# Patient Record
Sex: FEMALE
[De-identification: names, ages, dates, MRNs, and addresses within clinical notes are randomized per-mention and may not be internally consistent; named-entity substitution may affect disease eponyms.]

---

## 2017-12-31 ENCOUNTER — CHARTING TRANS (OUTPATIENT)
Dept: OTHER | Age: 62
End: 2017-12-31

## 2017-12-31 ENCOUNTER — LAB SERVICES (OUTPATIENT)
Dept: OTHER | Age: 62
End: 2017-12-31

## 2017-12-31 LAB — RAPID STREP GROUP A: NORMAL

## 2018-11-02 VITALS
OXYGEN SATURATION: 97 % | HEART RATE: 64 BPM | RESPIRATION RATE: 18 BRPM | BODY MASS INDEX: 25.38 KG/M2 | HEIGHT: 70 IN | WEIGHT: 177.25 LBS | TEMPERATURE: 98.4 F

## 2019-03-27 PROCEDURE — 88175 CYTOPATH C/V AUTO FLUID REDO: CPT | Performed by: NURSE PRACTITIONER

## 2025-02-14 RX ORDER — CALCITONIN SALMON 200 [IU]/.09ML
1 SPRAY, METERED NASAL DAILY
COMMUNITY

## 2025-02-14 RX ORDER — METRONIDAZOLE 500 MG/100ML
500 INJECTION, SOLUTION INTRAVENOUS EVERY 12 HOURS
Status: CANCELLED | OUTPATIENT
Start: 2025-02-14

## 2025-02-14 RX ORDER — ASPIRIN 81 MG/1
81 TABLET ORAL DAILY
COMMUNITY

## 2025-02-14 RX ORDER — LEVOTHYROXINE SODIUM 100 UG/1
100 TABLET ORAL DAILY
COMMUNITY

## 2025-02-14 RX ORDER — MULTIVIT WITH MINERALS/LUTEIN
250 TABLET ORAL NIGHTLY
COMMUNITY

## 2025-02-14 RX ORDER — GINSENG 100 MG
1 CAPSULE ORAL DAILY
COMMUNITY

## 2025-02-14 RX ORDER — CHOLECALCIFEROL (VITAMIN D3) 25 MCG
1 TABLET ORAL 2 TIMES DAILY
COMMUNITY

## 2025-02-14 RX ORDER — ALLOPURINOL 100 MG/1
100 TABLET ORAL DAILY
COMMUNITY

## 2025-02-18 ENCOUNTER — LABORATORY ENCOUNTER (OUTPATIENT)
Dept: LAB | Age: 70
End: 2025-02-18
Attending: OBSTETRICS & GYNECOLOGY
Payer: MEDICARE

## 2025-02-18 DIAGNOSIS — Z01.818 ENCOUNTER FOR PREADMISSION TESTING: ICD-10-CM

## 2025-02-18 LAB
ANTIBODY SCREEN: NEGATIVE
ERYTHROCYTE [DISTWIDTH] IN BLOOD BY AUTOMATED COUNT: 16.9 %
HCT VFR BLD AUTO: 36.8 %
HGB BLD-MCNC: 11.5 G/DL
MCH RBC QN AUTO: 27.6 PG (ref 26–34)
MCHC RBC AUTO-ENTMCNC: 31.3 G/DL (ref 31–37)
MCV RBC AUTO: 88.5 FL
PLATELET # BLD AUTO: 259 10(3)UL (ref 150–450)
RBC # BLD AUTO: 4.16 X10(6)UL
RH BLOOD TYPE: NEGATIVE
WBC # BLD AUTO: 7.7 X10(3) UL (ref 4–11)

## 2025-02-18 PROCEDURE — 85027 COMPLETE CBC AUTOMATED: CPT

## 2025-02-18 PROCEDURE — 36415 COLL VENOUS BLD VENIPUNCTURE: CPT

## 2025-02-18 PROCEDURE — 86900 BLOOD TYPING SEROLOGIC ABO: CPT

## 2025-02-18 PROCEDURE — 86850 RBC ANTIBODY SCREEN: CPT

## 2025-02-18 PROCEDURE — 86901 BLOOD TYPING SEROLOGIC RH(D): CPT

## 2025-02-26 NOTE — H&P
GYNECOLOGIC ONCOLOGY PRE-OPERATIVE H&P     DATE: 2/26/2025             HISTORY OF PRESENT ILLNESS:   Quiana Nuñez is a 69 year old female who was seen in the [facility] emergency room October 2024 for diffuse severe abdominal pain, was admitted for pancreatitis.      CT that showed a 4.3 cm right adnexal lesion      Follow up pelvic US showed endometrial thickness of 1.1 cm, a 1.7 cm endometrial lesion, and a right adnexal lesions     Pt reports menopause mid/late 40s; reports consistently heavy and painful periods while having cycle, denies vaginal bleeding since; had been under regular care of OBGYN all of life, was considering D&C right before menopause due to heavy periods      Patient was referred by Dr Albright.     INTERIM HISTORY:  Underwent MRI at [facility] in [place] Epic media     Completed labs     PAST MEDICAL HISTORY:       Past Medical History:   Diagnosis Date    Astrocytoma brain tumor (HCC) 1992    Essential hypertension      Glaucoma      History of brain shunt       current since 1992    Hyperlipidemia      Melanoma (HCC) 1989    Trigeminal neuralgia           SURGICAL HISTORY:         Past Surgical History:   Procedure Laterality Date    CHOLECYSTECTOMY   10/30/2024    EYE SURGERY   2019     retinal surgery    OTHER SURGICAL HISTORY   1992     brain shunt    OTHER SURGICAL HISTORY   2018     brain shunt replacement    OTHER SURGICAL HISTORY   2018     brain shunt blockage, blood clot    OTHER SURGICAL HISTORY   2018     kidney tumor removed    OTHER SURGICAL HISTORY         retina sx    TUBAL LIGATION   1991         ALLERGIES:    Allergies      Acid Blockers Suppo*    OTHER (SEE COMMENTS)    Comment:Hypertension (oral steroids)  Prednisone              JITTERY        MEDICATIONS:    Encounter Medications   latanoprost 0.005 % Ophthalmic Solution, , Disp: , Rfl:   Levothyroxine Sodium 25 MCG Oral Tab, TK 1 T PO Q MORNING, Disp: , Rfl: 2  Lisinopril-hydroCHLOROthiazide 20-12.5 MG Oral Tab, ,  Disp: , Rfl: 3  Rosuvastatin Calcium 5 MG Oral Tab, TAKE 1 TABLET BY MOUTH ONE TIME A DAY, Disp: , Rfl: 3  atenolol 50 MG Oral Tab, TK 1 T PO QD, Disp: , Rfl: 1     No facility-administered encounter medications on file as of 2025.         GYNECOLOGIC HISTORY:  No LMP recorded. (Menstrual status: Other).  Patient has no history on file for sexual activity.     LAST PAP:   25 NIL      PATHOLOGY:         Lab Results   Component Value Date     FINALDX   2025       Endometrium; biopsy:  - Predominantly blood, with very rare strips of epithelium, not further diagnostic  - Please see comment                TUMOR MARKERS:           Lab Results   Component Value Date      46.5 (H) 2025            Lab Results   Component Value Date     CEA 0.8 2025      No results found for: \"\"        LAST SCREENING MAMMOGRAM:  2024: There are no suspicious masses, calcifications, or areas of architectural distortion.         OBSTETRIC HISTORY:   OB History     T0    L2    SAB0  IAB0  Ectopic0  Multiple0  Live Births0      SOCIAL HISTORY:  Patient lives at [place], with family.     Ambulatory Status:  independent  Performance Status:  0 - Fully active, able to carry on all pre-disease performance without restriction.       Social History       Socioeconomic History      Marital status:       Spouse name: Not on file      Number of children: Not on file      Years of education: Not on file      Highest education level: Not on file    Occupational History      Not on file    Tobacco Use      Smoking status: Never      Smokeless tobacco: Never    Vaping Use      Vaping status: Never Used    Substance and Sexual Activity      Alcohol use: No      Drug use: No      Sexual activity: Not on file    Other Topics      Concerns:        Not on file    Social History Narrative      Not on file     Social Determinants of Health  Financial Resource Strain: Not on file  Food Insecurity: Not on  file  Transportation Needs: Not on file  Physical Activity: Not on file  Stress: Not on file  Social Connections: Not on file  Intimate Partner Violence: Not on file  Housing Stability: Not on file        FAMILY HISTORY:        Family History   Problem Relation Age of Onset    Breast Cancer Mother 70         survived    Heart Disease Mother      Hypertension Mother      Heart Disease Father      Hypertension Father      Prostate Cancer Father      Heart Disease Maternal Grandmother      Hypertension Maternal Grandmother      Diabetes Maternal Grandmother      Stroke Maternal Grandmother      Heart Disease Maternal Grandfather      Hypertension Maternal Grandfather      Diabetes Maternal Grandfather      Hypertension Paternal Grandmother      Hypertension Paternal Grandfather      Stroke Paternal Grandfather      Breast Cancer Paternal Aunt           REVIEW OF SYSTEMS:  General ROS: negative  Ophthalmic ROS: positive for - under care for optometrist   ENT ROS: negative  Cardiovascular ROS: no chest pain or dyspnea on exertion, negative  Respiratory ROS: no cough, shortness of breath, or wheezing, negative  Gastrointestinal ROS: no abdominal pain, change in bowel habits, or black or bloody stools, negative  Genito-Urinary ROS: no dysuria, trouble voiding, or hematuria, negative  Musculoskeletal ROS: negative  Neurological ROS: no TIA or stroke symptoms, negative  Psychological ROS: negative  Endocrine ROS: negative  Hematological and Lymphatic ROS: negative     PHYSICAL EXAMINATION FROM IN OFFICE VISIT ON 1/22/25:  /74   Wt 171 lb (77.6 kg)   BMI 23.85 kg/m²      GENERAL: alert and oriented, cooperative, in no acute distress  HEENT: extra ocular movement intact  THYROID: normal to inspection and palpation  LYMPH NODES: Cervical, supraclavicular, and axillary nodes normal.  LUNGS: clear to auscultation bilaterally  HEART: regular rate and rhythm  ABDOMEN: soft, non-tender. Bowel sounds normal. No masses,  no  organomegaly  EXTREMITIES: extremities normal, atraumatic, no cyanosis or edema  NEUROLOGIC: motor grossly intact  VULVA: normal appearing vulva with no masses, tenderness or lesions   URETHRA: normal without discharge or scarring  VAGINA: normal mucosa without prolapse or lesions  CERVIX: multiparous appearance and nabothian cyst  UTERUS: normal single, nontender  LEFT ADNEXA: non palpable  RIGHT ADNEXA:   fullness, non-tender  RECTOVAGINAL: deferred  BACK: symmetric, no curvature. ROM normal. No CVA tenderness.        ASSESSMENT:  Endometrial thickening on ultrasound     Endometrial mass     Ovarian mass, right     Elevated CA-125     69 year old female with diffuse severe abdominal pain, was admitted for pancreatitis with an incidental finding of an ovarian mass and thickened endometrium.     Oct 2024 CT that showed a 4.3 cm right adnexal lesion      Follow up pelvic US showed endometrial thickness of 1.1 cm, a 1.7 cm endometrial lesion, and a right adnexal lesions  -Spoke to [facility] regarding discrepency between the size of the right adnexal lesion on CT which says 4.3 cm and US saying 19 cm. Radiologist confirmed lesion is 4.3 cm and solid appeaing and the US will be addended     1/5/25 MRI pelvic 6.2 cm fibroid uterus with 3 fibroids measuring up to 1.4 cm, 1.1 cm endometrium with a 1.6 cm endometrial mass, left ovarian mass measuring  2.3 cm, 4.2 solid right ovarian mass, small amount of ascites, no lymphadenopathy.      1/2/25 EMBx non-diagnostic     CEA 0.8   46.5     The index of suspicion for malignancy is moderate      Comorbidities: [place],  shunt, tubal, right kidney tumor removed     PLAN:    Patient given options of alternative treatment/surgical intervention including hyst/BSO given risk for malignancy. Discussed the possibility of robotic assisted surgery if cleared by neurosurgery for  shunt as pt will be in 29 degrees of trendelenburg and there is risk of embolus with  laparoscopy surgery in the setting of  shunt. Risks reviewed with pt and spouse. Discussed the possibility of xlap.     Surgical intervention planned: RA TLH/BSO, staging, possible xlap     Patient counseled on Risk/Benefits/Alternatives/Indications including but not limited to anesthesia, bleeding, death, infection, injury to adjacent organs (including GI, , Vascular and Neurologic Structures), need for further operation, need for further therapy, transfusion, Venous Thrombolytic Disease, wound breakdown dehiscence, lymphedema, possible inability to stage, possible dissemination of disease.     Medical Clearance obtained by Dr. Dickey on 2/3/25  Neurosurgery Clearance obtained by Dr. Jonathan Avendano at [facility] on 1/24/25     Yanci Domingo PA-C  02/26/2025

## 2025-02-27 ENCOUNTER — HOSPITAL ENCOUNTER (OUTPATIENT)
Facility: HOSPITAL | Age: 70
Discharge: HOME | End: 2025-02-28
Attending: OBSTETRICS & GYNECOLOGY | Admitting: OBSTETRICS & GYNECOLOGY
Payer: MEDICARE

## 2025-02-27 ENCOUNTER — ANESTHESIA (OUTPATIENT)
Dept: SURGERY | Facility: HOSPITAL | Age: 70
End: 2025-02-27
Payer: MEDICARE

## 2025-02-27 ENCOUNTER — ANESTHESIA EVENT (OUTPATIENT)
Dept: SURGERY | Facility: HOSPITAL | Age: 70
End: 2025-02-27
Payer: MEDICARE

## 2025-02-27 DIAGNOSIS — N94.89 ENDOMETRIAL MASS: ICD-10-CM

## 2025-02-27 DIAGNOSIS — Z01.818 ENCOUNTER FOR PREADMISSION TESTING: Primary | ICD-10-CM

## 2025-02-27 LAB — RH BLOOD TYPE: NEGATIVE

## 2025-02-27 PROCEDURE — 88108 CYTOPATH CONCENTRATE TECH: CPT | Performed by: OBSTETRICS & GYNECOLOGY

## 2025-02-27 PROCEDURE — 0UT24ZZ RESECTION OF BILATERAL OVARIES, PERCUTANEOUS ENDOSCOPIC APPROACH: ICD-10-PCS | Performed by: OBSTETRICS & GYNECOLOGY

## 2025-02-27 PROCEDURE — 88307 TISSUE EXAM BY PATHOLOGIST: CPT | Performed by: OBSTETRICS & GYNECOLOGY

## 2025-02-27 PROCEDURE — 8E0W4CZ ROBOTIC ASSISTED PROCEDURE OF TRUNK REGION, PERCUTANEOUS ENDOSCOPIC APPROACH: ICD-10-PCS | Performed by: OBSTETRICS & GYNECOLOGY

## 2025-02-27 PROCEDURE — 07BC4ZX EXCISION OF PELVIS LYMPHATIC, PERCUTANEOUS ENDOSCOPIC APPROACH, DIAGNOSTIC: ICD-10-PCS | Performed by: OBSTETRICS & GYNECOLOGY

## 2025-02-27 PROCEDURE — 88331 PATH CONSLTJ SURG 1 BLK 1SPC: CPT | Performed by: OBSTETRICS & GYNECOLOGY

## 2025-02-27 PROCEDURE — 07BD4ZX EXCISION OF AORTIC LYMPHATIC, PERCUTANEOUS ENDOSCOPIC APPROACH, DIAGNOSTIC: ICD-10-PCS | Performed by: OBSTETRICS & GYNECOLOGY

## 2025-02-27 PROCEDURE — 0UT74ZZ RESECTION OF BILATERAL FALLOPIAN TUBES, PERCUTANEOUS ENDOSCOPIC APPROACH: ICD-10-PCS | Performed by: OBSTETRICS & GYNECOLOGY

## 2025-02-27 PROCEDURE — 88305 TISSUE EXAM BY PATHOLOGIST: CPT | Performed by: OBSTETRICS & GYNECOLOGY

## 2025-02-27 PROCEDURE — 0UT94ZZ RESECTION OF UTERUS, PERCUTANEOUS ENDOSCOPIC APPROACH: ICD-10-PCS | Performed by: OBSTETRICS & GYNECOLOGY

## 2025-02-27 PROCEDURE — 88309 TISSUE EXAM BY PATHOLOGIST: CPT | Performed by: OBSTETRICS & GYNECOLOGY

## 2025-02-27 RX ORDER — ATENOLOL 50 MG/1
50 TABLET ORAL DAILY
Status: DISCONTINUED | OUTPATIENT
Start: 2025-02-27 | End: 2025-02-27

## 2025-02-27 RX ORDER — HYDROMORPHONE HYDROCHLORIDE 1 MG/ML
0.6 INJECTION, SOLUTION INTRAMUSCULAR; INTRAVENOUS; SUBCUTANEOUS EVERY 5 MIN PRN
Status: DISCONTINUED | OUTPATIENT
Start: 2025-02-27 | End: 2025-02-27 | Stop reason: HOSPADM

## 2025-02-27 RX ORDER — LISINOPRIL AND HYDROCHLOROTHIAZIDE 12.5; 2 MG/1; MG/1
1 TABLET ORAL DAILY
Status: DISCONTINUED | OUTPATIENT
Start: 2025-02-27 | End: 2025-02-27

## 2025-02-27 RX ORDER — HYDROMORPHONE HYDROCHLORIDE 1 MG/ML
INJECTION, SOLUTION INTRAMUSCULAR; INTRAVENOUS; SUBCUTANEOUS
Status: COMPLETED
Start: 2025-02-27 | End: 2025-02-27

## 2025-02-27 RX ORDER — SODIUM CHLORIDE, SODIUM LACTATE, POTASSIUM CHLORIDE, CALCIUM CHLORIDE 600; 310; 30; 20 MG/100ML; MG/100ML; MG/100ML; MG/100ML
INJECTION, SOLUTION INTRAVENOUS CONTINUOUS
Status: ACTIVE | OUTPATIENT
Start: 2025-02-27 | End: 2025-02-27

## 2025-02-27 RX ORDER — HYDROMORPHONE HYDROCHLORIDE 1 MG/ML
0.8 INJECTION, SOLUTION INTRAMUSCULAR; INTRAVENOUS; SUBCUTANEOUS EVERY 2 HOUR PRN
Status: DISCONTINUED | OUTPATIENT
Start: 2025-02-27 | End: 2025-02-28

## 2025-02-27 RX ORDER — ACETAMINOPHEN 500 MG
1000 TABLET ORAL ONCE AS NEEDED
Status: DISCONTINUED | OUTPATIENT
Start: 2025-02-27 | End: 2025-02-27 | Stop reason: HOSPADM

## 2025-02-27 RX ORDER — DEXAMETHASONE SODIUM PHOSPHATE 4 MG/ML
VIAL (ML) INJECTION AS NEEDED
Status: DISCONTINUED | OUTPATIENT
Start: 2025-02-27 | End: 2025-02-27 | Stop reason: SURG

## 2025-02-27 RX ORDER — IBUPROFEN 600 MG/1
600 TABLET, FILM COATED ORAL ONCE AS NEEDED
Status: DISCONTINUED | OUTPATIENT
Start: 2025-02-27 | End: 2025-02-27 | Stop reason: HOSPADM

## 2025-02-27 RX ORDER — ONDANSETRON 2 MG/ML
4 INJECTION INTRAMUSCULAR; INTRAVENOUS EVERY 8 HOURS PRN
Status: DISCONTINUED | OUTPATIENT
Start: 2025-02-27 | End: 2025-02-28

## 2025-02-27 RX ORDER — METRONIDAZOLE 500 MG/100ML
500 INJECTION, SOLUTION INTRAVENOUS ONCE
Status: COMPLETED | OUTPATIENT
Start: 2025-02-27 | End: 2025-02-27

## 2025-02-27 RX ORDER — MIDAZOLAM HYDROCHLORIDE 1 MG/ML
INJECTION INTRAMUSCULAR; INTRAVENOUS AS NEEDED
Status: DISCONTINUED | OUTPATIENT
Start: 2025-02-27 | End: 2025-02-27 | Stop reason: SURG

## 2025-02-27 RX ORDER — ACETAMINOPHEN 500 MG
1000 TABLET ORAL ONCE
Status: DISCONTINUED | OUTPATIENT
Start: 2025-02-27 | End: 2025-02-27 | Stop reason: HOSPADM

## 2025-02-27 RX ORDER — LATANOPROST 50 UG/ML
1 SOLUTION/ DROPS OPHTHALMIC NIGHTLY
Status: DISCONTINUED | OUTPATIENT
Start: 2025-02-27 | End: 2025-02-28

## 2025-02-27 RX ORDER — HYDROMORPHONE HYDROCHLORIDE 1 MG/ML
0.4 INJECTION, SOLUTION INTRAMUSCULAR; INTRAVENOUS; SUBCUTANEOUS EVERY 5 MIN PRN
Status: DISCONTINUED | OUTPATIENT
Start: 2025-02-27 | End: 2025-02-27 | Stop reason: HOSPADM

## 2025-02-27 RX ORDER — ROSUVASTATIN CALCIUM 10 MG/1
10 TABLET, COATED ORAL NIGHTLY
Status: DISCONTINUED | OUTPATIENT
Start: 2025-02-27 | End: 2025-02-28

## 2025-02-27 RX ORDER — HYDRALAZINE HYDROCHLORIDE 20 MG/ML
10 INJECTION INTRAMUSCULAR; INTRAVENOUS ONCE
Status: DISCONTINUED | OUTPATIENT
Start: 2025-02-27 | End: 2025-02-27 | Stop reason: HOSPADM

## 2025-02-27 RX ORDER — ENOXAPARIN SODIUM 100 MG/ML
40 INJECTION SUBCUTANEOUS DAILY
Status: DISCONTINUED | OUTPATIENT
Start: 2025-02-28 | End: 2025-02-28

## 2025-02-27 RX ORDER — HEPARIN SODIUM 5000 [USP'U]/ML
5000 INJECTION, SOLUTION INTRAVENOUS; SUBCUTANEOUS ONCE
Status: COMPLETED | OUTPATIENT
Start: 2025-02-27 | End: 2025-02-27

## 2025-02-27 RX ORDER — ONDANSETRON 2 MG/ML
4 INJECTION INTRAMUSCULAR; INTRAVENOUS EVERY 6 HOURS PRN
Status: DISCONTINUED | OUTPATIENT
Start: 2025-02-27 | End: 2025-02-27 | Stop reason: HOSPADM

## 2025-02-27 RX ORDER — NALOXONE HYDROCHLORIDE 0.4 MG/ML
0.08 INJECTION, SOLUTION INTRAMUSCULAR; INTRAVENOUS; SUBCUTANEOUS AS NEEDED
Status: DISCONTINUED | OUTPATIENT
Start: 2025-02-27 | End: 2025-02-27 | Stop reason: HOSPADM

## 2025-02-27 RX ORDER — HYDROCODONE BITARTRATE AND ACETAMINOPHEN 5; 325 MG/1; MG/1
1 TABLET ORAL EVERY 6 HOURS PRN
Status: DISCONTINUED | OUTPATIENT
Start: 2025-02-27 | End: 2025-02-28

## 2025-02-27 RX ORDER — BUPIVACAINE HYDROCHLORIDE 2.5 MG/ML
INJECTION, SOLUTION EPIDURAL; INFILTRATION; INTRACAUDAL AS NEEDED
Status: DISCONTINUED | OUTPATIENT
Start: 2025-02-27 | End: 2025-02-27 | Stop reason: HOSPADM

## 2025-02-27 RX ORDER — HYDROCODONE BITARTRATE AND ACETAMINOPHEN 5; 325 MG/1; MG/1
2 TABLET ORAL ONCE AS NEEDED
Status: DISCONTINUED | OUTPATIENT
Start: 2025-02-27 | End: 2025-02-27 | Stop reason: HOSPADM

## 2025-02-27 RX ORDER — HYDROMORPHONE HYDROCHLORIDE 1 MG/ML
0.2 INJECTION, SOLUTION INTRAMUSCULAR; INTRAVENOUS; SUBCUTANEOUS EVERY 2 HOUR PRN
Status: DISCONTINUED | OUTPATIENT
Start: 2025-02-27 | End: 2025-02-28

## 2025-02-27 RX ORDER — ACETAMINOPHEN 325 MG/1
650 TABLET ORAL EVERY 4 HOURS PRN
Status: DISCONTINUED | OUTPATIENT
Start: 2025-02-27 | End: 2025-02-28

## 2025-02-27 RX ORDER — INDOCYANINE GREEN AND WATER 25 MG
KIT INJECTION AS NEEDED
Status: DISCONTINUED | OUTPATIENT
Start: 2025-02-27 | End: 2025-02-27 | Stop reason: HOSPADM

## 2025-02-27 RX ORDER — ONDANSETRON 2 MG/ML
INJECTION INTRAMUSCULAR; INTRAVENOUS
Status: COMPLETED
Start: 2025-02-27 | End: 2025-02-27

## 2025-02-27 RX ORDER — HYDROCODONE BITARTRATE AND ACETAMINOPHEN 5; 325 MG/1; MG/1
1 TABLET ORAL ONCE AS NEEDED
Status: DISCONTINUED | OUTPATIENT
Start: 2025-02-27 | End: 2025-02-27 | Stop reason: HOSPADM

## 2025-02-27 RX ORDER — ATENOLOL 50 MG/1
50 TABLET ORAL DAILY
Status: DISCONTINUED | OUTPATIENT
Start: 2025-02-28 | End: 2025-02-28

## 2025-02-27 RX ORDER — HYDROMORPHONE HYDROCHLORIDE 1 MG/ML
0.2 INJECTION, SOLUTION INTRAMUSCULAR; INTRAVENOUS; SUBCUTANEOUS EVERY 5 MIN PRN
Status: DISCONTINUED | OUTPATIENT
Start: 2025-02-27 | End: 2025-02-27 | Stop reason: HOSPADM

## 2025-02-27 RX ORDER — SODIUM CHLORIDE, SODIUM LACTATE, POTASSIUM CHLORIDE, CALCIUM CHLORIDE 600; 310; 30; 20 MG/100ML; MG/100ML; MG/100ML; MG/100ML
INJECTION, SOLUTION INTRAVENOUS CONTINUOUS
Status: DISCONTINUED | OUTPATIENT
Start: 2025-02-27 | End: 2025-02-28

## 2025-02-27 RX ORDER — DOCUSATE SODIUM 100 MG/1
100 CAPSULE, LIQUID FILLED ORAL 2 TIMES DAILY
Status: DISCONTINUED | OUTPATIENT
Start: 2025-02-28 | End: 2025-02-28

## 2025-02-27 RX ORDER — SODIUM CHLORIDE, SODIUM LACTATE, POTASSIUM CHLORIDE, CALCIUM CHLORIDE 600; 310; 30; 20 MG/100ML; MG/100ML; MG/100ML; MG/100ML
INJECTION, SOLUTION INTRAVENOUS CONTINUOUS
Status: DISCONTINUED | OUTPATIENT
Start: 2025-02-27 | End: 2025-02-27 | Stop reason: HOSPADM

## 2025-02-27 RX ORDER — ONDANSETRON 2 MG/ML
INJECTION INTRAMUSCULAR; INTRAVENOUS AS NEEDED
Status: DISCONTINUED | OUTPATIENT
Start: 2025-02-27 | End: 2025-02-27 | Stop reason: SURG

## 2025-02-27 RX ORDER — METOCLOPRAMIDE HYDROCHLORIDE 5 MG/ML
10 INJECTION INTRAMUSCULAR; INTRAVENOUS EVERY 8 HOURS PRN
Status: DISCONTINUED | OUTPATIENT
Start: 2025-02-27 | End: 2025-02-27 | Stop reason: HOSPADM

## 2025-02-27 RX ORDER — HYDROMORPHONE HYDROCHLORIDE 1 MG/ML
0.4 INJECTION, SOLUTION INTRAMUSCULAR; INTRAVENOUS; SUBCUTANEOUS EVERY 2 HOUR PRN
Status: DISCONTINUED | OUTPATIENT
Start: 2025-02-27 | End: 2025-02-28

## 2025-02-27 RX ORDER — HEPARIN SODIUM 5000 [USP'U]/ML
INJECTION, SOLUTION INTRAVENOUS; SUBCUTANEOUS
Status: COMPLETED
Start: 2025-02-27 | End: 2025-02-27

## 2025-02-27 RX ORDER — ROCURONIUM BROMIDE 10 MG/ML
INJECTION, SOLUTION INTRAVENOUS AS NEEDED
Status: DISCONTINUED | OUTPATIENT
Start: 2025-02-27 | End: 2025-02-27 | Stop reason: SURG

## 2025-02-27 RX ORDER — LEVOTHYROXINE SODIUM 100 UG/1
100 TABLET ORAL DAILY
Status: DISCONTINUED | OUTPATIENT
Start: 2025-02-28 | End: 2025-02-28

## 2025-02-27 RX ORDER — ONDANSETRON 4 MG/1
4 TABLET, FILM COATED ORAL EVERY 8 HOURS PRN
Status: DISCONTINUED | OUTPATIENT
Start: 2025-02-27 | End: 2025-02-28

## 2025-02-27 RX ORDER — ALLOPURINOL 100 MG/1
100 TABLET ORAL DAILY
Status: DISCONTINUED | OUTPATIENT
Start: 2025-02-28 | End: 2025-02-28

## 2025-02-27 RX ADMIN — MIDAZOLAM HYDROCHLORIDE 2 MG: 1 INJECTION INTRAMUSCULAR; INTRAVENOUS at 07:35:00

## 2025-02-27 RX ADMIN — SODIUM CHLORIDE, SODIUM LACTATE, POTASSIUM CHLORIDE, CALCIUM CHLORIDE: 600; 310; 30; 20 INJECTION, SOLUTION INTRAVENOUS at 10:06:00

## 2025-02-27 RX ADMIN — DEXAMETHASONE SODIUM PHOSPHATE 4 MG: 4 MG/ML VIAL (ML) INJECTION at 07:48:00

## 2025-02-27 RX ADMIN — ROCURONIUM BROMIDE 30 MG: 10 INJECTION, SOLUTION INTRAVENOUS at 07:48:00

## 2025-02-27 RX ADMIN — ONDANSETRON 4 MG: 2 INJECTION INTRAMUSCULAR; INTRAVENOUS at 07:48:00

## 2025-02-27 RX ADMIN — ROCURONIUM BROMIDE 10 MG: 10 INJECTION, SOLUTION INTRAVENOUS at 07:42:00

## 2025-02-27 RX ADMIN — SODIUM CHLORIDE, SODIUM LACTATE, POTASSIUM CHLORIDE, CALCIUM CHLORIDE: 600; 310; 30; 20 INJECTION, SOLUTION INTRAVENOUS at 07:34:00

## 2025-02-27 RX ADMIN — METRONIDAZOLE 500 MG: 500 INJECTION, SOLUTION INTRAVENOUS at 07:45:00

## 2025-02-27 NOTE — PLAN OF CARE
Admitted to from [facility]. Pt aox4. On Ra. Abd soft and tender, pt declined pain meds. Lap sites CDI.Becerra with clear yellow urine. Poc updated, pt verbalized understanding.

## 2025-02-27 NOTE — ANESTHESIA POSTPROCEDURE EVALUATION
[facility]    Quiana Irbyrose Patient Status:  Outpatient in a Bed   Age/Gender 69 year old female MRN [id number]   Location [facility] SURGERY Attending David Winters MD   Hosp Day # 0 PCP CATHERINE ASENCIO       Anesthesia Post-op Note    ROBOT-ASSISTED TOTAL LAPAROSCOPIC HYSTERECTOMY, BILATERAL SALPINGO- OOPHORECTOMY, STAGING,    Procedure Summary       Date: 02/27/25 Room / Location:  MAIN OR 09 / [facility] MAIN OR    Anesthesia Start: 0733 Anesthesia Stop:     Procedure: ROBOT-ASSISTED TOTAL LAPAROSCOPIC HYSTERECTOMY, BILATERAL SALPINGO- OOPHORECTOMY, STAGING, (Bilateral: Abdomen) Diagnosis: (ENDOMETRIAL MASS)    Surgeons: David Winters MD Anesthesiologist: Rocky Ramirez MD    Anesthesia Type: general ASA Status: 2            Anesthesia Type: No value filed.    Vitals Value Taken Time   /74 02/27/25 1006   Temp 97 02/27/25 1006   Pulse 66 02/27/25 1006   Resp 18 02/27/25 1006   SpO2 94 02/27/25 1006           Patient Location: PACU    Anesthesia Type: general    Airway Patency: patent    Postop Pain Control: adequate    Mental Status: mildly sedated but able to meaningfully participate in the post-anesthesia evaluation    Nausea/Vomiting: none    Cardiopulmonary/Hydration status: stable euvolemic    Complications: no apparent anesthesia related complications    Postop vital signs: stable    Dental Exam: Unchanged from Preop    Patient to be discharged from PACU when criteria met.

## 2025-02-27 NOTE — BRIEF OP NOTE
Pre-Operative Diagnosis: ENDOMETRIAL MASS     Post-Operative Diagnosis: ENDOMETRIAL MASS      Procedure Performed:   ROBOT-ASSISTED TOTAL LAPAROSCOPIC HYSTERECTOMY, BILATERAL SALPINGO- OOPHORECTOMY, STAGING,    Surgeons and Role:     * David Winters MD - Primary    Assistant(s):  Surgical Assistant.: Katie Allen CSA  PA: Yanci Domingo PA-C; Vivian Warren PA     Surgical Findings: see full op report     Specimen: sent to pathology     Estimated Blood Loss: Blood Output: 100 mL (2/27/2025  9:34 AM)      Dictation Number:  TBD    YENI Yen  2/27/2025  9:50 AM

## 2025-02-27 NOTE — ANESTHESIA PREPROCEDURE EVALUATION
PRE-OP EVALUATION    Patient Name: Quiana Nuñez    Admit Diagnosis: ENDOMETRIAL MASS    Pre-op Diagnosis: ENDOMETRIAL MASS    ROBOT-ASSISTED TOTAL LAPAROSCOPIC HYSTERECTOMY, BILATERAL SALPINGO- OOPHORECTOMY, STAGING, POSSIBLE EXPLORATORY LAPAROTOMY    Anesthesia Procedure: ROBOT-ASSISTED TOTAL LAPAROSCOPIC HYSTERECTOMY, BILATERAL SALPINGO- OOPHORECTOMY, STAGING, POSSIBLE EXPLORATORY LAPAROTOMY (Bilateral: Abdomen)    Surgeons and Role:     * David Winters MD - Primary    Pre-op vitals reviewed.  Temp: 96.9 °F (36.1 °C)  Pulse: 54  Resp: 16  BP: 149/72  SpO2: 99 %  Body mass index is 23.71 kg/m².    Current medications reviewed.  Hospital Medications:   acetaminophen (Tylenol Extra Strength) tab 1,000 mg  1,000 mg Oral Once    lactated ringers infusion   Intravenous Continuous    [COMPLETED] heparin (Porcine) 5000 UNIT/ML injection 5,000 Units  5,000 Units Subcutaneous Once    ceFAZolin (Ancef) 2g in 10mL IV syringe premix  2 g Intravenous Once    metroNIDAZOLE in sodium chloride 0.79% (Flagyl) 5 mg/mL IVPB premix 500 mg  500 mg Intravenous Once    ceFAZolin (Ancef) 2 g/10mL IV syringe premix           Outpatient Medications:   Prescriptions Prior to Admission[1]    Allergies: Corticosteroids support therapy      Anesthesia Evaluation    Patient summary reviewed.    Anesthetic Complications           GI/Hepatic/Renal    Negative GI/hepatic/renal ROS.                             Cardiovascular                  (+) hypertension                                     Endo/Other    Negative endo/other ROS.                              Pulmonary    Negative pulmonary ROS.                       Neuro/Psych    Negative neuro/psych ROS.                                  Past Surgical History:   Procedure Laterality Date    Other surgical history  1992    brain shunt    Other surgical history  2018    brain shunt replacement    Other surgical history  2018    brain shunt blockage, blood clot    Other surgical history   2018    kidney tumor removed    Other surgical history      retina sx     Social History     Socioeconomic History    Marital status:    Tobacco Use    Smoking status: Never    Smokeless tobacco: Never   Vaping Use    Vaping status: Never Used   Substance and Sexual Activity    Alcohol use: No    Drug use: No     History   Drug Use No     Available pre-op labs reviewed.  Lab Results   Component Value Date    WBC 7.7 02/18/2025    RBC 4.16 02/18/2025    HGB 11.5 (L) 02/18/2025    HCT 36.8 02/18/2025    MCV 88.5 02/18/2025    MCH 27.6 02/18/2025    MCHC 31.3 02/18/2025    RDW 16.9 02/18/2025    .0 02/18/2025               Airway      Mallampati: II       Cardiovascular    Cardiovascular exam normal.         Dental    Dentition appears grossly intact         Pulmonary    Pulmonary exam normal.                 Other findings              ASA: 2   Plan: general  NPO status verified and           Plan/risks discussed with: patient                Present on Admission:  **None**             [1]   Medications Prior to Admission   Medication Sig Dispense Refill Last Dose/Taking    Cyanocobalamin (B-12 OR) Take 1 tablet by mouth daily.   2/26/2025 at  7:00 PM    levothyroxine 100 MCG Oral Tab Take 1 tablet (100 mcg total) by mouth daily.   2/27/2025 at  3:00 AM    allopurinol 100 MG Oral Tab Take 1 tablet (100 mg total) by mouth daily.   2/27/2025 at  3:00 AM    calcitonin, salmon, 200 UNIT/ACT Nasal Solution 1 spray by Nasal route daily.   2/26/2025 at  8:00 AM    aspirin 81 MG Oral Tab EC Take 1 tablet (81 mg total) by mouth daily.   2/17/2025    Zinc 50 MG Oral Tab Take 1 tablet by mouth daily.   2/26/2025 at  7:00 PM    Coenzyme Q10 (COQ10 OR) Take 1 tablet by mouth daily.   2/26/2025 at  8:00 AM    Multiple Vitamins-Minerals (MULTIVITAMIN ADULTS OR) Take 1 tablet by mouth daily.   2/26/2025 at  8:00 AM    ascorbic acid 250 MG Oral Tab Take 1 tablet (250 mg total) by mouth at bedtime.   2/26/2025 at  7:00 PM     Cholecalciferol (VITAMIN D3) 25 MCG Oral Tab Take 1 tablet (1,000 Units total) by mouth in the morning and 1 tablet (1,000 Units total) before bedtime.   2/26/2025 at  7:00 PM    Calcium Carbonate-Vit D-Min (CALCIUM 1200 OR) Take 1 tablet by mouth daily.   2/26/2025 at  7:00 PM    latanoprost 0.005 % Ophthalmic Solution Place 1 drop into both eyes nightly.   2/26/2025 at  7:00 PM    Lisinopril-hydroCHLOROthiazide 20-12.5 MG Oral Tab Take 1 tablet by mouth daily.  3 2/26/2025 at  8:00 AM    rosuvastatin 10 MG Oral Tab Take 1 tablet (10 mg total) by mouth nightly.  3 2/26/2025 at  7:00 PM    atenolol 50 MG Oral Tab Take 1 tablet (50 mg total) by mouth daily.  1 2/27/2025 at  3:00 AM

## 2025-02-27 NOTE — PLAN OF CARE
Problem: Patient/Family Goals  Goal: Patient/Family Long Term Goal  Description: Patient's Long Term Goal: pain mgt    Interventions:  - meds as needed  - See additional Care Plan goals for specific interventions  Outcome: Progressing  Goal: Patient/Family Short Term Goal  Description: Patient's Short Term Goal: do go home    Interventions:   - diet  ambulate  - See additional Care Plan goals for specific interventions  Outcome: Progressing     Problem: PAIN - ADULT  Goal: Verbalizes/displays adequate comfort level or patient's stated pain goal  Description: INTERVENTIONS:  - Encourage pt to monitor pain and request assistance  - Assess pain using appropriate pain scale  - Administer analgesics based on type and severity of pain and evaluate response  - Implement non-pharmacological measures as appropriate and evaluate response  - Consider cultural and social influences on pain and pain management  - Manage/alleviate anxiety  - Utilize distraction and/or relaxation techniques  - Monitor for opioid side effects  - Notify MD/LIP if interventions unsuccessful or patient reports new pain  - Anticipate increased pain with activity and pre-medicate as appropriate  Outcome: Progressing     Problem: RISK FOR INFECTION - ADULT  Goal: Absence of fever/infection during anticipated neutropenic period  Description: INTERVENTIONS  - Monitor WBC  - Administer growth factors as ordered  - Implement neutropenic guidelines  Outcome: Progressing     Problem: SAFETY ADULT - FALL  Goal: Free from fall injury  Description: INTERVENTIONS:  - Assess pt frequently for physical needs  - Identify cognitive and physical deficits and behaviors that affect risk of falls.  - [place] fall precautions as indicated by assessment.  - Educate pt/family on patient safety including physical limitations  - Instruct pt to call for assistance with activity based on assessment  - Modify environment to reduce risk of injury  - Provide assistive devices  as appropriate  - Consider OT/PT consult to assist with strengthening/mobility  - Encourage toileting schedule  Outcome: Progressing

## 2025-02-27 NOTE — CONSULTS
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  Patient was admitted today for robotic assisted total lap hysterectomy, BSO, staging.  She tolerated the procedure without any apparent complications.  She has some minimal nausea, no vomiting.  No dizziness or lightheadedness.  No chest pain or shortness of breath.  Rates her pain overall as 4/10.  We are asked to see the patient in consultation for medical comanagement.    Past Medical History:  Past Medical History:    Astrocytoma brain tumor (HCC)    renal cancer 2019    Disorder of thyroid    Essential hypertension    Glaucoma    High blood pressure    High cholesterol    History of brain shunt    current since     Hx of motion sickness    Hyperlipidemia    Migraines    PONV (postoperative nausea and vomiting)    Renal disorder    right partial nephrectomy    Trigeminal neuralgia    Visual impairment    glasses        Past Surgical History:   Past Surgical History:   Procedure Laterality Date    Other surgical history      brain shunt    Other surgical history      brain shunt replacement    Other surgical history  2018    brain shunt blockage, blood clot    Other surgical history  2018    kidney tumor removed    Other surgical history      retina sx       Social History:  reports that she has never smoked. She has never used smokeless tobacco. She reports that she does not drink alcohol and does not use drugs.    Family  History:   Family History   Problem Relation Age of Onset    Heart Disease Father     Hypertension Father     Breast Cancer Mother 70        survived    Heart Disease Mother     Hypertension Mother     Heart Disease Maternal Grandmother     Hypertension Maternal Grandmother     Diabetes Maternal Grandmother     Stroke Maternal Grandmother     Heart Disease Maternal Grandfather     Hypertension Maternal Grandfather     Diabetes Maternal Grandfather     Hypertension Paternal Grandmother     Hypertension Paternal Grandfather     Stroke Paternal Grandfather         Allergies: Allergies[1]    Medications:  Medications Ordered Prior to Encounter[2]    Review of Systems:   A comprehensive 14 point review of systems was completed.    Pertinent positives and negatives noted in the HPI.    Physical Exam:    /68 (BP Location: Right arm)   Pulse 58   Temp 98.3 °F (36.8 °C) (Temporal)   Resp 17   Ht 5' 11\" (1.803 m)   Wt 170 lb (77.1 kg)   LMP 02/27/2002 (Approximate)   SpO2 97%   BMI 23.71 kg/m²   General: No acute distress. Alert and oriented x 3.  HEENT: Normocephalic atraumatic. Moist mucous membranes. EOM-I. PERRLA. Anicteric.  Neck: No lymphadenopathy. No JVD. No carotid bruits.  Respiratory: Clear to auscultation bilaterally. No wheezes. No rhonchi.  Cardiovascular: S1, S2. Regular rate and rhythm. No murmurs, rubs or gallops. Equal pulses.   Chest and Back: No tenderness or deformity.  Abdomen: Soft, nontender, nondistended.  Positive bowel sounds. No rebound, guarding or organomegaly.  Neurologic: No focal neurological deficits. CNII-XII grossly intact.  Musculoskeletal: Moves all extremities.  Extremities: No edema or cyanosis.  Integument: No rashes or lesions.   Psychiatric: Appropriate mood and affect.      Diagnostic Data:      Labs:  No results for input(s): \"WBC\", \"HGB\", \"MCV\", \"PLT\", \"BAND\", \"INR\" in the last 168 hours.    Invalid input(s): \"LYM#\", \"MONO#\", \"BASOS#\", \"EOSIN#\"    No results for  input(s): \"GLU\", \"BUN\", \"CREATSERUM\", \"GFRAA\", \"GFRNAA\", \"CA\", \"ALB\", \"NA\", \"K\", \"CL\", \"CO2\", \"ALKPHO\", \"AST\", \"ALT\", \"BILT\", \"TP\" in the last 168 hours.    No results for input(s): \"PTP\", \"INR\" in the last 168 hours.    COVID-19 Lab Results    COVID-19  No results found for: \"COVID19\"    Pro-Calcitonin  No results for input(s): \"PCT\" in the last 168 hours.    Cardiac  No results for input(s): \"TROP\", \"PBNP\" in the last 168 hours.    Creatinine Kinase  No results for input(s): \"CK\" in the last 168 hours.    Inflammatory Markers  No results for input(s): \"CRP\", \"MIMA\", \"LDH\", \"DDIMER\" in the last 168 hours.    Imaging: Imaging data reviewed in Epic.      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      Endometrial mass sp robotic assisted total lap hysterectomy, BSO, staging 2/27/25  Post op pain  Main management per GYN/ONC service, including pain control, wound care, DVT prophylaxis, and disposition  Encourage early ambulation  Encourage I-S use  PT/OT  Post op labs ordered  -holding asa until ok to resume per gyn/onc    HTN  DL  -will monitor BP >> appears upper level of normal 140s this afternoon  -resume home meds hydrochlorothiazide/lisinopril  -pt states she takes atenolol >> and took am dose today >> reordered for the morning    Gout   -cont allopurinol  -pt thinks she may be having a flare but states its not very painful    Hx of RCC sp R partial nephrectomy  -monitor renal function    Hx of astrocytoma w/  shunt - 1992  -revision of shunt 2018  -doing well, no acute issues    Hypothyroidism  -synthroid    Migraines  -stable          Thank you for allowing me to participate in the care of this patient.  I will be following the patient while she is in the hospital.          Katelynn Salinas MD  [facility]  Hospitalist  Pager [phone]  Answering Service number: [phone]                    [1]   Allergies  Allergen Reactions    Corticosteroids Support Therapy OTHER (SEE COMMENTS)     Hypertension (oral steroids)   [2]   No current facility-administered medications on file prior to encounter.     Current Outpatient Medications on File Prior to Encounter   Medication Sig Dispense Refill    Cyanocobalamin (B-12 OR) Take 1 tablet by mouth daily.      levothyroxine 100 MCG Oral Tab Take 1 tablet (100 mcg total) by mouth daily.      allopurinol 100 MG Oral Tab Take 1 tablet (100 mg total) by mouth daily.      calcitonin, salmon, 200 UNIT/ACT Nasal Solution 1 spray by Nasal route daily.      aspirin 81 MG Oral Tab EC Take 1 tablet (81 mg total) by mouth daily.      Zinc 50 MG Oral Tab Take 1 tablet by mouth daily.      Coenzyme Q10 (COQ10 OR) Take 1 tablet by mouth daily.      Multiple Vitamins-Minerals (MULTIVITAMIN ADULTS OR) Take 1 tablet by mouth daily.      ascorbic acid 250 MG Oral Tab Take 1 tablet (250 mg total) by mouth at bedtime.      Cholecalciferol (VITAMIN D3) 25 MCG Oral Tab Take 1 tablet (1,000 Units total) by mouth in the morning and 1 tablet (1,000 Units total) before bedtime.      Calcium Carbonate-Vit D-Min (CALCIUM 1200 OR) Take 1 tablet by mouth daily.      latanoprost 0.005 % Ophthalmic Solution Place 1 drop into both eyes nightly.      Lisinopril-hydroCHLOROthiazide 20-12.5 MG Oral Tab Take 1 tablet by mouth daily.  3    rosuvastatin 10 MG Oral Tab Take 1 tablet (10 mg total) by mouth nightly.  3    atenolol 50 MG Oral Tab Take 1 tablet (50 mg total) by mouth daily.  1

## 2025-02-27 NOTE — ANESTHESIA PROCEDURE NOTES
Airway  Date/Time: 2/27/2025 7:44 AM  Urgency: elective      General Information and Staff    Patient location during procedure: OR  Anesthesiologist: Rocky Ramirez MD  Performed: anesthesiologist   Performed by: Rocky Ramirez MD  Authorized by: Rocky Ramirez MD      Indications and Patient Condition  Indications for airway management: anesthesia  Sedation level: deep  Preoxygenated: yes  Patient position: sniffing  Mask difficulty assessment: 1 - vent by mask    Final Airway Details  Final airway type: endotracheal airway      Successful airway: ETT  Cuffed: yes   Successful intubation technique: direct laryngoscopy  Endotracheal tube insertion site: oral  Blade: Rafael  Blade size: #4  ETT size (mm): 7.0    Placement verified by: capnometry   Measured from: lips  Number of attempts at approach: 1

## 2025-02-27 NOTE — OPERATIVE REPORT
[facility]    PATIENT'S NAME: BILLY FUENTES   ATTENDING PHYSICIAN: David Winters MD   OPERATING PHYSICIAN: David Winters MD   PATIENT ACCOUNT#:   [id number]    [facility]  [facility]  MEDICAL RECORD #:   [id number]       YOB: 1955  ADMISSION DATE:       02/27/2025      OPERATION DATE:  02/27/2025    OPERATIVE REPORT    PREOPERATIVE DIAGNOSIS:    1.   Complex right adnexal mass.  2.   Elevated CA-125.  3.   Uterine fibroids.  4.   Endometrial mass.  5.   History of ventriculoperitoneal shunt.  POSTOPERATIVE DIAGNOSIS:    1.   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  2.   Endometrial polyps, benign, pending final pathology.  3.   Uterine fibroids.  4.   Extensive pelvic adhesions with possible endometriosis.  5.   Mild to moderately enlarged common iliac lymph nodes.  PROCEDURE:    1.   Robotic-assisted extensive lysis of adhesions.  2.   Total laparoscopic hysterectomy.  3.   Bilateral salpingo-oophorectomy.  4.   Right infundibulopelvic and utero-ovarian ICG ligament injection.  5.   Right pelvic periaortic sentinel lymph node dissection.  6.   Left common iliac lymph node biopsy.    ASSISTANTS:  Katie Allen CSA; Yanci Domingo PA-C; and Vivian Warren PA-C.      INTRAVENOUS FLUIDS:  1 L.    URINE OUTPUT:  300 mL.    ESTIMATED BLOOD LOSS:  Less than 100 mL.    DRAINS:  Becerra catheter.    COMPLICATIONS:  None.    CONDITION:  Stable to the recovery room.    INDICATIONS:  This is a 69-year-old female who had severe abdominal pain and was found to have pancreatitis.  Incidentally was found to have a thickened endometrium with 4.5 cm solid ovarian mass.  The patient was discharged and subsequently had followup.  Tumor markers were obtained.  Her CA-125 was 46.5; it was mildly elevated.  Endometrial biopsy was performed, although the patient had no postmenopausal bleeding; it was nondiagnostic.  I counseled the patient on options.  Given  elevated CA-125 and the solid adnexal mass, surgery was recommended.  The patient has a  shunt that has been present for 20 to 30 years.  She has undergone previous laparoscopic surgeries, including cholecystectomy and right kidney tumor removal, since the placement of the shunt.  Nonetheless, I did discuss potential complications given Trendelenburg and risk of emboli with laparoscopic surgery.  The patient was sent to Neurosurgery to evaluate it and cleared her for the procedure.  I discussed option of sentinel lymph node dissection given solid nature of mass and CA-125 elevation, and plan was made to proceed as above.  She was counseled on risks, benefits, alternatives, and indications.  Informed consent was obtained.     FINDINGS:  Upper abdomen appeared normal.  There were some mild perihepatic adhesions.  The catheter was seen entering into the peritoneal cavity near the falciform ligament in the upper abdomen.  The distal portion of it lay in the lower abdomen near the pelvis.  There were significant adhesions of the sigmoid and rectosigmoid colon to the posterior aspect of the uterus.  There were some adhesions of the left ovary to the colon and also the pelvic side wall.  These adhesions were significantly severe, although appropriate planes could be found with meticulous dissection.  There was no evidence of significant diverticulosis or diverticulitis in the colon to explain this etiology.  On frozen section, Pathology found that there was some evidence of endometriosis on the posterior aspect of the uterus that may explain these findings.  The other cause could potentially be chronic inflammation in a gravity-dependent area from her CSF fluid.  The left ovary was mildly enlarged, approximately 2 to 2.5 cm.  The right ovary was approximately 4.5 cm and appeared solid.  Frozen section on both of these was consistent with spindle cell tumor with possible fibroma/thecoma.  The uterus had multiple fibroids.   Frozen section showed 2 endometrial polyps that appeared benign as well.  In the retroperitoneal area, there was a mildly enlarged right internal iliac lymph node that also had sentinel lymph node mapping, and therefore this was excised.  There was some sentinel lymph node mapping that occurred in the aortic bifurcation and also in the right periaortic region at the level of the gonadal vessels.  There were mildly prominent superficial left common iliac lymph nodes that were seen of unclear etiology.  The location and superficiality was not consistent with malignancy from the ovary, and only one of these was sampled.      OPERATIVE TECHNIQUE:  The patient was taken to the operating room.  She was given general endotracheal tube intubation anesthesia.  She was prepped and draped in the usual sterile fashion.  A Becerra catheter was inserted.  Pneumoperitoneum was created in the left upper quadrant at Meza's point.  Initial pressure was 3 mmHg.  The abdomen was insufflated with 3.5 L of CO2 gas.  A 5 mm AirSeal port was placed.  Position was confirmed.  Robotic trocars were placed 4 cm above the umbilicus slightly to the left of the midline away from her  shunt insertion, another one 10 cm lateral to this and one 10 cm lateral to it on the right side.  There were some adhesions in the left upper quadrant, and these were taken down with Endoshears.  Another port was placed on the left side, approximately 20 cm from the midline.  A 5 mm assistant port was placed in the right lateral mid quadrant.  The cervix was visualized from below, and a medium VCare was placed under direct visualization without any complication.  Patient was placed in Trendelenburg position.   shunt was seen functioning during and after the completion of the case.  The bowel was positioned out of the way.  Pelvic washings were obtained.  Robot was docked.  Standard instrumentation was applied.  The uterus was somewhat fixed to the anterior  aspect of the colon.  At this time I spent approximately 20 minutes meticulously dissecting the wall of the colon, including the mesentery and the rectosigmoid area off the posterior aspect of the uterus.  Meticulous dissection was performed with no use of energy, and we were able to dissect this off systematically by getting into the appropriate planes and  it off completely.  The colon appeared unharmed.  During this process, the left ovary was also freed off the left pelvic side wall.  The colon was inspected.  It appeared to have no evidence of injury.  This was inspected again at the end of the case.  Round ligaments on both sides were cauterized and cut.  The peritoneum between the round ligament and infundibulopelvic ligament was divided.  The retroperitoneal space was opened.  The ureters were identified.  The infundibulopelvic ligament was skeletonized, injected with ICG dye.  Approximately 2 mL were injected into the gonadal vessel and also the utero-ovarian ligament on the right side.  Both infundibulopelvic ligaments were now cauterized, and cut.  Anterior leaf of the broad ligament was then taken down.  The bladder was dissected off the pubocervical fascia.  The posterior peritoneum was skeletonized.  The uterine vessels were skeletonized, cauterized, and cut.  The descending branches were similarly cauterized and cut.  There was some thickening in the posterior cul-de-sac.  This area the pathologist felt may represent endometriosis.  This was taken with the uterus and the specimen.  We dissected down to the level of the colpotomy cup, and this thickened area in the posterior cul-de-sac was removed with the uterus.  Colpotomy was made anteriorly, carried out circumferentially.  Specimen was delivered through the vagina without any complication or rupture of the ovarian specimen.  This was sent off for frozen section.  Given solid finding and the elevated tumor marker, I went on to proceed with  sentinel lymph node dissection as planned.  The pelvic sidewall on the right side was opened.  Lymph node uptake was seen in the right internal iliac region.  There was also a prominent lymph node in this area.  These lymph nodes were excised and labeled as right internal iliac sentinel lymph nodes.  The peritoneum over the right common iliac was opened.  We identified 3 subcentimeter superficial left common iliac lymph nodes.  They were mildly enlarged of unclear etiology.  I biopsied one of these as the location of these was not consistent with what I would expect for an ovarian malignancy.  We dissected out the aortocaval region.  A vital structure was seen.  Using Firefly, sentinel lymph node mapping was seen in the bifurcation of the aorta.  This lymph node was excised.  The main sentinel lymph node was found at the region of the right gonadal vessel, inserting into the vena cava, and there was a lymph node in this area.  This lymph node was excised in its entirety and labeled as right periaortic sentinel lymph node.  Lymph nodes were delivered through the vagina.  We then placed Surgicel and Surgical powder in the area of the lymph node dissection.  The vagina was closed with 3-0, 180 V-Loc sutures starting from right lateral edge and working our way to the left and back to the right side.  Transvaginal examination showed excellent closure of the vagina.  The patient was taken out of Trendelenburg position, and pelvis was filled with irrigation.  We used a proctoscope and injected air into the rectosigmoid colon from below.  Adequate distention was seen.  There was no extravasation of air bubble to suggest injury.  Fluid was suctioned, and Surgicel and Surgicel powder were placed in the areas.  There were raw surfaces along the rectosigmoid colon where it had been dissected off the uterus but no active bleeding.  The procedure at this point was deemed complete.  Frozen section showed the above diagnosis.  No  other intervention was necessary.  The patient does not have any significant omentum that appeared abnormal and therefore biopsy was not performed.  Robotic instruments were removed.  Robot was undocked.  Cannulas were removed under direct visualization.  AirSeal was used to deflate the abdomen.  The incisions were closed with subcuticular 4-0 Vicryl suture along with Dermabond.  Approximately 30 mL of Marcaine were injected over the 6 incision sites.  Becerra catheter showed clear urine.  Vagina had no evidence of lacerations.  I was present and scrubbed for the entire procedure.  The procedure took approximately 25% to 30% longer than a procedure of this nature due to the extensive adhesions and additional steps required in the pelvis and also to evaluate the colon but was otherwise uncomplicated.  Katie Allen served as assistant and was invaluable in providing traction and countertraction, exposure, and was needed for the safety and efficiency of the procedure.     Dictated By David Winters MD  d: 02/27/2025 10:11:53  t: 02/27/2025 11:21:38  Job [id number]  /

## 2025-02-28 VITALS
OXYGEN SATURATION: 98 % | HEART RATE: 61 BPM | HEIGHT: 71 IN | RESPIRATION RATE: 23 BRPM | WEIGHT: 170 LBS | BODY MASS INDEX: 23.8 KG/M2 | TEMPERATURE: 98 F | SYSTOLIC BLOOD PRESSURE: 118 MMHG | DIASTOLIC BLOOD PRESSURE: 53 MMHG

## 2025-02-28 LAB
ANION GAP SERPL CALC-SCNC: 8 MMOL/L (ref 0–18)
BASOPHILS # BLD AUTO: 0.01 X10(3) UL (ref 0–0.2)
BASOPHILS NFR BLD AUTO: 0.1 %
BUN BLD-MCNC: 28 MG/DL (ref 9–23)
CALCIUM BLD-MCNC: 9.4 MG/DL (ref 8.7–10.6)
CHLORIDE SERPL-SCNC: 104 MMOL/L (ref 98–112)
CO2 SERPL-SCNC: 25 MMOL/L (ref 21–32)
CREAT BLD-MCNC: 1.44 MG/DL
EGFRCR SERPLBLD CKD-EPI 2021: 39 ML/MIN/1.73M2 (ref 60–?)
EOSINOPHIL # BLD AUTO: 0 X10(3) UL (ref 0–0.7)
EOSINOPHIL NFR BLD AUTO: 0 %
ERYTHROCYTE [DISTWIDTH] IN BLOOD BY AUTOMATED COUNT: 16.2 %
GLUCOSE BLD-MCNC: 132 MG/DL (ref 70–99)
HCT VFR BLD AUTO: 30.5 %
HGB BLD-MCNC: 9.9 G/DL
IMM GRANULOCYTES # BLD AUTO: 0.05 X10(3) UL (ref 0–1)
IMM GRANULOCYTES NFR BLD: 0.4 %
LYMPHOCYTES # BLD AUTO: 1.34 X10(3) UL (ref 1–4)
LYMPHOCYTES NFR BLD AUTO: 11.9 %
MCH RBC QN AUTO: 27.8 PG (ref 26–34)
MCHC RBC AUTO-ENTMCNC: 32.5 G/DL (ref 31–37)
MCV RBC AUTO: 85.7 FL
MONOCYTES # BLD AUTO: 0.77 X10(3) UL (ref 0.1–1)
MONOCYTES NFR BLD AUTO: 6.9 %
NEUTROPHILS # BLD AUTO: 9.06 X10 (3) UL (ref 1.5–7.7)
NEUTROPHILS # BLD AUTO: 9.06 X10(3) UL (ref 1.5–7.7)
NEUTROPHILS NFR BLD AUTO: 80.7 %
OSMOLALITY SERPL CALC.SUM OF ELEC: 291 MOSM/KG (ref 275–295)
PLATELET # BLD AUTO: 234 10(3)UL (ref 150–450)
POTASSIUM SERPL-SCNC: 4.7 MMOL/L (ref 3.5–5.1)
RBC # BLD AUTO: 3.56 X10(6)UL
SODIUM SERPL-SCNC: 137 MMOL/L (ref 136–145)
WBC # BLD AUTO: 11.2 X10(3) UL (ref 4–11)

## 2025-02-28 PROCEDURE — 80048 BASIC METABOLIC PNL TOTAL CA: CPT | Performed by: PHYSICIAN ASSISTANT

## 2025-02-28 PROCEDURE — 85025 COMPLETE CBC W/AUTO DIFF WBC: CPT | Performed by: PHYSICIAN ASSISTANT

## 2025-02-28 RX ORDER — DIPHENHYDRAMINE HYDROCHLORIDE 50 MG/ML
25 INJECTION INTRAMUSCULAR; INTRAVENOUS ONCE
Status: COMPLETED | OUTPATIENT
Start: 2025-02-28 | End: 2025-02-28

## 2025-02-28 RX ORDER — DIPHENHYDRAMINE HCL 25 MG
CAPSULE ORAL
Status: DISCONTINUED
Start: 2025-02-28 | End: 2025-02-28

## 2025-02-28 RX ORDER — TRAMADOL HYDROCHLORIDE 50 MG/1
50 TABLET ORAL EVERY 6 HOURS PRN
Qty: 20 TABLET | Refills: 0 | Status: SHIPPED | OUTPATIENT
Start: 2025-02-28

## 2025-02-28 RX ORDER — HYDROCODONE BITARTRATE AND ACETAMINOPHEN 5; 325 MG/1; MG/1
1 TABLET ORAL EVERY 6 HOURS PRN
Qty: 20 TABLET | Refills: 0 | Status: SHIPPED | OUTPATIENT
Start: 2025-02-28 | End: 2025-02-28

## 2025-02-28 RX ORDER — DIPHENHYDRAMINE HYDROCHLORIDE 50 MG/ML
INJECTION INTRAMUSCULAR; INTRAVENOUS
Status: DISCONTINUED
Start: 2025-02-28 | End: 2025-02-28

## 2025-02-28 RX ORDER — TRAMADOL HYDROCHLORIDE 50 MG/1
50 TABLET ORAL EVERY 6 HOURS PRN
Status: DISCONTINUED | OUTPATIENT
Start: 2025-02-28 | End: 2025-02-28

## 2025-02-28 RX ORDER — ACETAMINOPHEN 325 MG/1
650 TABLET ORAL EVERY 4 HOURS PRN
Qty: 20 TABLET | Refills: 0 | Status: SHIPPED | OUTPATIENT
Start: 2025-02-28

## 2025-02-28 RX ORDER — PSEUDOEPHEDRINE HCL 30 MG
100 TABLET ORAL 2 TIMES DAILY
Qty: 30 CAPSULE | Refills: 0 | Status: SHIPPED | OUTPATIENT
Start: 2025-02-28

## 2025-02-28 NOTE — DISCHARGE SUMMARY
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    Hospital Course:   Reason for Admission: underwent RA TLH/BSO, extensive lysis of adhesions, injection of ICG dye, and staging on 25    Discharge Physical Exam:  Vital Signs:  Blood pressure 118/53, pulse 61, temperature 98.4 °F (36.9 °C), temperature source Oral, resp. rate 23, height 5' 11\" (1.803 m), weight 170 lb (77.1 kg), last menstrual period 2002, SpO2 98%.     General: No acute distress. Alert and oriented x 3.  GENERAL: alert and oriented, cooperative, in no acute distress and mild facial swelling and redness secondary to recent Norco reaction  ABDOMEN:  soft, minimally distended, and mildly tender to palpation  INCISION/SURGICAL WOUNDS:  6 laparoscopic trocar incisions C/D/I without erythema, warmth, or drainage.  PELVIC: Pelvic exam: examination not indicated.  Psychiatric: Appropriate mood and affect.    Hospital Course: mostly uncomplicated    Complications: Patient experienced one instance of facial flushing/swelling following second dose of Norco for pain which was relieved with Benadryl.     Consultants         Provider   Role Specialty     Bob Batista DO      Consulting Physician Internal Medicine          Surgical Procedures       Case IDs Date Procedure Surgeon Location Status    [id number] 25 ROBOT-ASSISTED TOTAL LAPAROSCOPIC HYSTERECTOMY, BILATERAL SALPINGO-  OOPHORECTOMY, STAGING, David Winters MD  MAIN OR Comp          Pending Labs       Order Current Status    Cytology fluids In process    Specimen to Pathology Tissue In process          ASSESSMENT:  POD 1     Good UOP-- 675mL of urine since quispe removal     Vitals normal     WBC mildly elevated, afebrile, likely reactive     Hgb with anticipated drop     Cr elevated, but decreased since pre-op.     Discharge Plan:   Discharge Condition: Stable    Current Discharge Medication List        New Orders    Details   acetaminophen 325 MG Oral Tab Take 2 tablets (650 mg total) by mouth every 4 (four) hours as needed.      docusate sodium 100 MG Oral Cap Take 100 mg by mouth 2 (two) times daily.      Simethicone 80 MG Oral Tab Take 80 mg by mouth 3 (three) times daily as needed.      traMADol 50 MG Oral Tab Take 1 tablet (50 mg total) by mouth every 6 (six) hours as needed.           Home Meds - Unchanged    Details   Cyanocobalamin (B-12 OR) Take 1 tablet by mouth daily.      levothyroxine 100 MCG Oral Tab Take 1 tablet (100 mcg total) by mouth daily.      allopurinol 100 MG Oral Tab Take 1 tablet (100 mg total) by mouth daily.      calcitonin, salmon, 200 UNIT/ACT Nasal Solution 1 spray by Nasal route daily.      aspirin 81 MG Oral Tab EC Take 1 tablet (81 mg total) by mouth daily.      Zinc 50 MG Oral Tab Take 1 tablet by mouth daily.      Coenzyme Q10 (COQ10 OR) Take 1 tablet by mouth daily.      Multiple Vitamins-Minerals (MULTIVITAMIN ADULTS OR) Take 1 tablet by mouth daily.      ascorbic acid 250 MG Oral Tab Take 1 tablet (250 mg total) by mouth at bedtime.      Cholecalciferol (VITAMIN D3) 25 MCG Oral Tab Take 1 tablet (1,000 Units total) by mouth in the morning and 1 tablet (1,000 Units total) before bedtime.      Calcium Carbonate-Vit D-Min (CALCIUM 1200 OR) Take 1 tablet by mouth daily.      latanoprost 0.005 % Ophthalmic Solution Place 1 drop into both eyes nightly.      Lisinopril-hydroCHLOROthiazide 20-12.5  MG Oral Tab Take 1 tablet by mouth daily.      rosuvastatin 10 MG Oral Tab Take 1 tablet (10 mg total) by mouth nightly.      atenolol 50 MG Oral Tab Take 1 tablet (50 mg total) by mouth daily.                 Discharge Diet: As tolerated, unrestricted    Discharge Activity: Pelvic rest until cleared, No Driving until cleared, May shower, and No tub baths/swimming pools/jacuzzi, and no lifting greater than 10lbs      PLAN:  Discharge to home.    Regular diet as tolerated    Tylenol or Tramadol as needed for pain    Simethicone for gas pain    Colace for soft regular stools    Encouraged ambulation    Recommended hydration to improve Cr, will reassess at post op visit    No driving for 2 weeks.     No lifting greater than 10lbs.     No tub baths or swimming pools for 6 weeks.     Pelvic rest, meaning nothing in the vagina for 12 weeks.     FOLLOW UP:  RTC in 2 weeks for post op visit and incision check. We will review pathology in detail at that time.    Follow up:      Follow-up Information       David Winters MD Follow up in 2 week(s).    Specialty: Gynecology Oncology  Why: For post-op visit  Contact information:  [address]  [facility] [address] [phone]                               Other Discharge Instructions:         Ok to restart Aspirin on 2/29/25.      Yanci Domingo PA-C   2/28/2025

## 2025-02-28 NOTE — PROGRESS NOTES
Patient discharged to home. Discharge instructions reviewed with the patient and her spouse, and the patient and her spouse both verbalized their understanding of the patient's discharge instructions. The patient was taken down to the South Entrance by wheelchair by her Patient Care Technician. She will be driven home by her .

## 2025-02-28 NOTE — PROGRESS NOTES
[facility] Hospitalist Progress Note     CC: Hospital Follow up    PCP: CATHERINE ASENCIO       Subjective:     No CP, SOB, or N/V.    OBJECTIVE:    Blood pressure 133/55, pulse 61, temperature 98 °F (36.7 °C), temperature source Oral, resp. rate 21, height 5' 11\" (1.803 m), weight 170 lb (77.1 kg), last menstrual period 02/27/2002, SpO2 98%.    Temp:  [97.1 °F (36.2 °C)-98.9 °F (37.2 °C)] 98 °F (36.7 °C)  Pulse:  [56-65] 61  Resp:  [18-21] 21  BP: (107-167)/(45-75) 133/55  SpO2:  [95 %-98 %] 98 %      Intake/Output:    Intake/Output Summary (Last 24 hours) at 2/28/2025 1220  Last data filed at 2/28/2025 1100  Gross per 24 hour   Intake 1480 ml   Output 950 ml   Net 530 ml       Last 3 Weights   02/27/25 0603 170 lb (77.1 kg)   02/14/25 1739 170 lb (77.1 kg)   09/03/21 0940 181 lb (82.1 kg)   03/27/19 1051 179 lb (81.2 kg)       Exam   Gen: Alert, no acute distress  Heent: Normocephalic, atraumatic, neck supple, EOMI, PERRLA  Pulm: Lungs CTAB, normal respiratory effort  CV:  Regular rate and rhythm, no murmurs/rubs/gallops  Abd: Soft, nontender, nondistended, bowel sounds present  Extremities: No peripheral edema, no clubbing, pulses intact   Skin: No rashes or lesions  Neuro: AOx3, no focal neurologic deficits, CN II-XII grossly intact  Psych: appropriate mood and affect      Data Review:       Labs:     Recent Labs   Lab 02/28/25  0459   RBC 3.56*   HGB 9.9*   HCT 30.5*   MCV 85.7   MCH 27.8   MCHC 32.5   RDW 16.2   NEPRELIM 9.06*   WBC 11.2*   .0         Recent Labs   Lab 02/28/25  0459   *   BUN 28*   CREATSERUM 1.44*   EGFRCR 39*   CA 9.4      K 4.7      CO2 25.0       No results for input(s): \"ALT\", \"AST\", \"ALB\", \"AMYLASE\", \"LIPASE\", \"LDH\" in the last 168 hours.    Invalid input(s): \"ALPHOS\", \"TBIL\", \"DBIL\", \"TPROT\"      Imaging:  No results found.      Meds:      enoxaparin  40 mg Subcutaneous Daily    docusate sodium  100 mg Oral BID    allopurinol  100 mg Oral Daily     latanoprost  1 drop Both Eyes Nightly    levothyroxine  100 mcg Oral Daily    rosuvastatin  10 mg Oral Nightly    atenolol  50 mg Oral Daily    lisinopril (Prinivil; Zestril) 20 mg, hydroCHLOROthiazide 12.5 mg for Zestoretic 20-12.5 ([facility] only)   Oral Daily      lactated ringers 40 mL/hr at 02/27/25 0734       ondansetron **OR** ondansetron    acetaminophen    HYDROcodone-acetaminophen    HYDROmorphone **OR** HYDROmorphone **OR** HYDROmorphone       Assessment/Plan:     Quiana Nuñez is a 69 year old female with PMH sig for hypothyroidism, HTN, DL, migraines, hx of RCC sp R partial nephrectomy, 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        Endometrial mass sp robotic assisted total lap hysterectomy, BSO, staging 2/27/25  Post op pain  Main management per GYN/ONC service, including pain control, wound care, DVT prophylaxis, and disposition  Encourage early ambulation  Encourage I-S use  PT/OT  Post op labs ordered - hgb reviewed - 9.9 this morning.   Continue aspirin once okay with surgery team      HTN  DL  -resume home meds hydrochlorothiazide/lisinopril and atenolol on discharge.      Gout   -cont allopurinol  -pt thinks she may be having a flare but states its not very painful     Hx of RCC sp R partial nephrectomy  -monitor renal function     Hx of astrocytoma w/  shunt - 1992  -revision of shunt 2018  -doing well, no acute issues     Hypothyroidism  -synthroid     Migraines  -stable    Dispo: Cleared for discharge once okay with surgery team.    Outpatient records reviewed. Questions/concerns were discussed with patient and/or family by bedside.   A total of 36 minutes were taken with patient and coordinating care.     DO Yahaira Higgins [facility]  Hospitalist  Contact via [employer]    Supplementary Documentation:   DVT Mechanical Prophylaxis:   SCDs, Early ambuation  DVT Pharmacologic Prophylaxis   Medication     enoxaparin (Lovenox) 40 MG/0.4ML SUBQ injection 40 mg         DVT Pharmacologic prophylaxis: Aspirin 81 mg      Code Status: Not on file  Becerra: No urinary catheter in place  Becerra Duration (in days):   Central line:    LIBERTAD: 2/28/2025

## 2025-02-28 NOTE — PROGRESS NOTES
Pt is alert and oriented. VSS on room air. Tolerating diet well w/o nausea. Pain is controlled with prn Narco. Lap sites with dermabond, CDI. Becerra in place, monitoring output for possible removal. IVF infusing, last dose of Ancef given as ordered. Pt ambulated halls before bed. Plan of care discussed with pt, call light in reach.

## 2025-02-28 NOTE — PROGRESS NOTES
GYNECOLOGIC ONCOLOGY POST-OPERATIVE PROGRESS NOTE     MEDICAL RECORD #: [id number]   DATE OF SERVICE: 2/28/2025             Reason for visit: Post-operative Day 1    HISTORY OF PRESENT ILLNESS:  69 year old female with a history of thickened endometrium, right ovarian mass, and elevated tumor markers is POD1 after RA TLH/BSO and staging.     GI//GYNE Complaint: reports mild abdominal bloating/gas pain and minimal pink vaginal spotting  Other complaints: experienced facial flushing and swelling after afternoon dose of Norco which was relieved with Benadryl    Doing well post-operatively. Pain well controlled on PO medications. Has been passing flatus and belching. Ambulating frequently. No nausea or vomitting with soft diet.     Interim History:  Underwent surgery    Patient's medications, allergies, past medical, surgical, social and family histories were reviewed and updated as appropriate.    Pathology/Lab Results:  Recent Results (from the past 720 hours)   CBC, Platelet; No Differential    Collection Time: 02/18/25  9:06 AM   Result Value Ref Range    WBC 7.7 4.0 - 11.0 x10(3) uL    RBC 4.16 3.80 - 5.30 x10(6)uL    HGB 11.5 (L) 12.0 - 16.0 g/dL    HCT 36.8 35.0 - 48.0 %    .0 150.0 - 450.0 10(3)uL    MCV 88.5 80.0 - 100.0 fL    MCH 27.6 26.0 - 34.0 pg    MCHC 31.3 31.0 - 37.0 g/dL    RDW 16.9 %   ABORH (Blood Type)    Collection Time: 02/18/25  9:06 AM   Result Value Ref Range    ABO BLOOD TYPE O     RH BLOOD TYPE Negative    Antibody Screen    Collection Time: 02/18/25  9:06 AM   Result Value Ref Range    Antibody Screen Negative    ABORH Confirmation    Collection Time: 02/27/25  7:33 AM   Result Value Ref Range    ABO BLOOD TYPE O     RH BLOOD TYPE Negative    Basic Metabolic Panel (8)    Collection Time: 02/28/25  4:59 AM   Result Value Ref Range    Glucose 132 (H) 70 - 99 mg/dL    Sodium 137 136 - 145 mmol/L    Potassium 4.7 3.5 - 5.1 mmol/L    Chloride 104 98 - 112 mmol/L    CO2 25.0 21.0 - 32.0  mmol/L    Anion Gap 8 0 - 18 mmol/L    BUN 28 (H) 9 - 23 mg/dL    Creatinine 1.44 (H) 0.55 - 1.02 mg/dL    Calcium, Total 9.4 8.7 - 10.6 mg/dL    Calculated Osmolality 291 275 - 295 mOsm/kg    eGFR-Cr 39 (L) >=60 mL/min/1.73m2   CBC With Differential With Platelet    Collection Time: 02/28/25  4:59 AM   Result Value Ref Range    WBC 11.2 (H) 4.0 - 11.0 x10(3) uL    RBC 3.56 (L) 3.80 - 5.30 x10(6)uL    HGB 9.9 (L) 12.0 - 16.0 g/dL    HCT 30.5 (L) 35.0 - 48.0 %    .0 150.0 - 450.0 10(3)uL    MCV 85.7 80.0 - 100.0 fL    MCH 27.8 26.0 - 34.0 pg    MCHC 32.5 31.0 - 37.0 g/dL    RDW 16.2 %    Neutrophil Absolute Prelim 9.06 (H) 1.50 - 7.70 x10 (3) uL    Neutrophil Absolute 9.06 (H) 1.50 - 7.70 x10(3) uL    Lymphocyte Absolute 1.34 1.00 - 4.00 x10(3) uL    Monocyte Absolute 0.77 0.10 - 1.00 x10(3) uL    Eosinophil Absolute 0.00 0.00 - 0.70 x10(3) uL    Basophil Absolute 0.01 0.00 - 0.20 x10(3) uL    Immature Granulocyte Absolute 0.05 0.00 - 1.00 x10(3) uL    Neutrophil % 80.7 %    Lymphocyte % 11.9 %    Monocyte % 6.9 %    Eosinophil % 0.0 %    Basophil % 0.1 %    Immature Granulocyte % 0.4 %     Pathology pending.     Physical examination:    /53 (BP Location: Right arm)   Pulse 61   Temp 98.4 °F (36.9 °C) (Oral)   Resp 23   Ht 5' 11\" (1.803 m)   Wt 170 lb (77.1 kg)   LMP 02/27/2002 (Approximate)   SpO2 98%   BMI 23.71 kg/m²     GENERAL: alert and oriented, cooperative, in no acute distress and mild facial swelling and redness secondary to recent Norco reaction  ABDOMEN:  soft, minimally distended, and mildly tender to palpation  INCISION/SURGICAL WOUNDS:  6 laparoscopic trocar incisions C/D/I without erythema, warmth, or drainage.  PELVIC: Pelvic exam: examination not indicated.    ASSESSMENT:  POD 1    Good UOP-- 675mL of urine since quispe removal    Vitals normal    WBC elevated, afebrile, likely reactive    Hgb with anticipated drop    Cr elevated, but decreased since pre-op.     PLAN:  LMWH for  DVT prophylaxis    Continue soft diet during hospital admission    Becerra was discontinued this AM per orders.     Tolerating to PO pain meds-- order placed for Tramadol in place of Norco secondary to reaction.     Encouraged Simethicone PRN for gas pain.    Encouraged ambulation and hydration     The patient verbalized good understanding of this. I will keep you informed of her progress. Thank you for allowing me to participate in the care of this patient.    Yanci Domingo PA-C  02/28/25

## 2025-03-01 NOTE — PROGRESS NOTES
The patient was administered 1 Norco tablet at 1220, and she reported having swollen cheeks and facial flushing approximately 1 hour later. This writer went to the patient's room to evaluate her and observed the patient's swollen cheeks and facial flushing. Dr. Gayle was notified and he ordered Benadryl 25 mg IV push x 1 dose.

## 2025-03-01 NOTE — PROGRESS NOTES
Benadryl 25 mg IV push administered to the patient per MD order. The patient denies having any difficulty with breathing or swallowing, and her vital signs are stable.

## 2025-03-01 NOTE — PROGRESS NOTES
YENI Yen, was notified about the patient's swollen cheeks and facial flushing after Norco administration, and she changed the patient's pain medication from [place] to Tramadol.

## 2025-03-11 LAB — NON GYNE INTERPRETATION: NEGATIVE

## (undated) DEVICE — ABSORBABLE WOUND CLOSURE DEVICE: Brand: V-LOC 180

## (undated) DEVICE — AIRSEAL 5 MM ACCESS PORT AND LOW PROFILE OBTURATOR WITH BLADELESS OPTICAL TIP, 120 MM LENGTH: Brand: AIRSEAL

## (undated) DEVICE — SUT MCRYL 4-0 18IN PS-2 ABSRB UD 19MM 3/8 CIR

## (undated) DEVICE — GLOVE SUR 6.5 SENSICARE PI PIP CRM PWD F

## (undated) DEVICE — LUBRICANT ELECTRD 4ML ANTISTICK SOL W/ FOAM

## (undated) DEVICE — GLOVE SUR 8 SENSICARE PI PIP GRN PWD F

## (undated) DEVICE — LAPAROVUE VISIBILITY SYSTEM LAPAROSCOPIC SOLUTIONS: Brand: LAPAROVUE

## (undated) DEVICE — COLUMN DRAPE

## (undated) DEVICE — HUNTER GASPER TIP, DISPOSABLE: Brand: RENEW

## (undated) DEVICE — SIGMOIDOSCOPE LIGHTED BIOSEAL

## (undated) DEVICE — FENESTRATED BIPOLAR FORCEPS: Brand: ENDOWRIST

## (undated) DEVICE — GLOVE SUR 7.5 SENSICARE PI PIP CRM PWD F

## (undated) DEVICE — MUCUS TRAP, 10FR, DELEE, W/VA: Brand: MEDLINE INDUSTRIES, INC.

## (undated) DEVICE — GYN LAP/ROBOTIC: Brand: MEDLINE INDUSTRIES, INC.

## (undated) DEVICE — ZZ--CONVERTED-TO-500976-PENCIL SMK EVAC L10FT MPLR BLDE JAW OPN

## (undated) DEVICE — ARM DRAPE

## (undated) DEVICE — BLADELESS OBTURATOR: Brand: WECK VISTA

## (undated) DEVICE — PROGRASP FORCEPS: Brand: ENDOWRIST

## (undated) DEVICE — SEAL

## (undated) DEVICE — TROCAR: Brand: KII FIOS FIRST ENTRY

## (undated) DEVICE — POWDER HEMSTAT 3GM OXIDIZED REGENERATED CELOS

## (undated) DEVICE — MEGA NEEDLE DRIVER: Brand: ENDOWRIST

## (undated) DEVICE — INSUFFLATION NEEDLE TO ESTABLISH PNEUMOPERITONEUM.: Brand: INSUFFLATION NEEDLE

## (undated) DEVICE — 3M™ IOBAN™ 2 ANTIMICROBIAL INCISE DRAPE 6648EZ: Brand: IOBAN™ 2

## (undated) DEVICE — VCARE MEDIUM, UTERINE MANIPULATOR, VAGINAL-CERVICAL-AHLUWALIA'S-RETRACTOR-ELEVATOR: Brand: VCARE

## (undated) DEVICE — TIP COVER ACCESSORY

## (undated) DEVICE — MONOPOLAR CURVED SCISSORS: Brand: ENDOWRIST

## (undated) DEVICE — GLOVE SUR 7 SENSICARE PI PIP GRN PWD F

## (undated) DEVICE — TUBE SUCTION COLPOTOMIZOR 35MM

## (undated) DEVICE — AIRSEAL TRI-LUMEN LILTERED TUBE SET: Brand: AIRSEAL

## (undated) DEVICE — ABSORBABLE HEMOSTAT (OXIDIZED REGENERATED CELLULOSE): Brand: SURGICEL NU-KNIT

## (undated) DEVICE — CRADLE ARM W5XH3XL24IN PUR FOAM NONCOMPRESSED

## (undated) DEVICE — 40580 - THE PINK PAD - ADVANCED TRENDELENBURG POSITIONING KIT: Brand: 40580 - THE PINK PAD - ADVANCED TRENDELENBURG POSITIONING KIT

## (undated) DEVICE — ADHESIVE SKIN TOP FOR WND CLSR DERMBND ADV